# Patient Record
Sex: MALE | Race: WHITE | NOT HISPANIC OR LATINO | Employment: FULL TIME | URBAN - METROPOLITAN AREA
[De-identification: names, ages, dates, MRNs, and addresses within clinical notes are randomized per-mention and may not be internally consistent; named-entity substitution may affect disease eponyms.]

---

## 2018-11-13 ENCOUNTER — OFFICE VISIT (OUTPATIENT)
Dept: URGENT CARE | Facility: CLINIC | Age: 53
End: 2018-11-13
Payer: COMMERCIAL

## 2018-11-13 VITALS
WEIGHT: 270 LBS | TEMPERATURE: 98 F | DIASTOLIC BLOOD PRESSURE: 78 MMHG | RESPIRATION RATE: 18 BRPM | SYSTOLIC BLOOD PRESSURE: 156 MMHG | HEIGHT: 70 IN | OXYGEN SATURATION: 100 % | BODY MASS INDEX: 38.65 KG/M2 | HEART RATE: 80 BPM

## 2018-11-13 DIAGNOSIS — S61.219A LACERATION WITHOUT FOREIGN BODY OF UNSPECIFIED FINGER WITHOUT DAMAGE TO NAIL, INITIAL ENCOUNTER: Primary | ICD-10-CM

## 2018-11-13 PROCEDURE — S9088 SERVICES PROVIDED IN URGENT: HCPCS | Performed by: PHYSICIAN ASSISTANT

## 2018-11-13 PROCEDURE — 99203 OFFICE O/P NEW LOW 30 MIN: CPT | Performed by: PHYSICIAN ASSISTANT

## 2018-11-13 PROCEDURE — 12002 RPR S/N/AX/GEN/TRNK2.6-7.5CM: CPT | Performed by: PHYSICIAN ASSISTANT

## 2018-11-13 RX ORDER — AMLODIPINE BESYLATE 10 MG/1
TABLET ORAL
COMMUNITY
Start: 2018-11-02

## 2018-11-13 NOTE — PROGRESS NOTES
[facility]        NAME: Guillermina Jarrett is a 47 y o  male  : 1965    MRN: [id number]  DATE: 2018  TIME: 1:01 PM    Assessment and Plan   Laceration without foreign body of unspecified finger without damage to nail, initial encounter [[id number] [id number]]  1  Laceration without foreign body of unspecified finger without damage to nail, initial encounter       Patient Instructions     Keep wound clean and dry  Return in 10 days to have sutures removed  Follow up with PCP in 3-5 days  Proceed to  ER if symptoms worsen  Chief Complaint     Chief Complaint   Patient presents with    finger laceration     lacerated left 3rd finger on left 3rd finger     History of Present Illness     60-year-old male presents with laceration to distal aspect of left middle finger  Patient was attempting to use a new Omate  when he cut finger  Patient received tetanus shot 7 years ago  Patient denies numbness, weakness, tingling  Review of Systems   Review of Systems   Constitutional: Negative for activity change, appetite change, chills, diaphoresis, fatigue, fever and unexpected weight change  Cardiovascular: Negative for chest pain, palpitations and leg swelling  Skin: Positive for wound  Negative for color change, pallor and rash           Current Medications       Current Outpatient Prescriptions:     amLODIPine (NORVASC) 10 mg tablet, , Disp: , Rfl:     Current Allergies     Allergies as of 2018    (No Known Allergies)            The following portions of the patient's history were reviewed and updated as appropriate: allergies, current medications, past family history, past medical history, past social history, past surgical history and problem list      Past Medical History:   Diagnosis Date    Hypertension        Past Surgical History:   Procedure Laterality Date    NO PAST SURGERIES         Family History   Problem Relation Age of Onset    No Known Problems Mother    Anjelica Zepeda Cancer Father          Medications have been verified  Objective   /78   Pulse 80   Temp 98 °F (36 7 °C) (Tympanic)   Resp 18   Ht 5' 10" (1 778 m)   Wt 122 kg (270 lb)   SpO2 100%   BMI 38 74 kg/m²        Physical Exam     Physical Exam   Constitutional: He appears well-developed and well-nourished  Cardiovascular: Normal rate, regular rhythm, normal heart sounds and intact distal pulses  Exam reveals no gallop and no friction rub  No murmur heard  Pulmonary/Chest: Effort normal and breath sounds normal  No respiratory distress  He has no wheezes  He has no rales  Abdominal: Soft  Bowel sounds are normal  He exhibits no distension  There is no tenderness  There is no rebound and no guarding  Skin:   4 cm flap laceration to the distal aspect of left 3rd digit; wound is clean with no foreign bodies       Laceration repair  Date/Time: 11/13/2018 1:06 PM  Performed by: Terrance Suresh  Authorized by: Terrance Suresh   Consent: Verbal consent obtained    Patient identity confirmed: verbally with patient  Body area: upper extremity  Location details: left long finger  Laceration length: 4 cm  Foreign bodies: no foreign bodies  Tendon involvement: none  Nerve involvement: none  Vascular damage: no  Anesthesia: local infiltration and digital block    Anesthesia:  Local Anesthetic: lidocaine 2% without epinephrine  Anesthetic total: 2 mL    Sedation:  Patient sedated: no    Wound Dehiscence:  Superficial Wound Dehiscence: simple closure      Procedure Details:  Irrigation solution: saline  Skin closure: 5-0 nylon  Number of sutures: 8  Technique: simple  Approximation: close  Approximation difficulty: simple  Dressing: 4x4 sterile gauze and pressure dressing

## 2018-11-24 ENCOUNTER — OFFICE VISIT (OUTPATIENT)
Dept: URGENT CARE | Facility: CLINIC | Age: 53
End: 2018-11-24
Payer: COMMERCIAL

## 2018-11-24 VITALS
DIASTOLIC BLOOD PRESSURE: 83 MMHG | RESPIRATION RATE: 20 BRPM | HEART RATE: 95 BPM | HEIGHT: 70 IN | WEIGHT: 270 LBS | SYSTOLIC BLOOD PRESSURE: 143 MMHG | OXYGEN SATURATION: 96 % | TEMPERATURE: 98.6 F | BODY MASS INDEX: 38.65 KG/M2

## 2018-11-24 DIAGNOSIS — Z48.02 ENCOUNTER FOR REMOVAL OF SUTURES: Primary | ICD-10-CM

## 2018-11-24 PROCEDURE — 99211 OFF/OP EST MAY X REQ PHY/QHP: CPT | Performed by: EMERGENCY MEDICINE

## 2018-11-24 NOTE — PROGRESS NOTES
[facility]        NAME: Rupa Yadav is a 47 y o  male  : 1965    MRN: [id number]  DATE: 2018  TIME: 1:07 PM    Assessment and Plan   No primary diagnosis found  No diagnosis found  Patient Instructions     There are no Patient Instructions on file for this visit  Follow up with PCP in 3-5 days  Proceed to  ER if symptoms worsen  Chief Complaint     Chief Complaint   Patient presents with    Suture / Staple Removal     left 3rd finger     Suture removal  Date/Time: 2018 1:06 PM  Performed by: Kathi Gabriel by: Ousmane Alvarez     Patient location:  Bedside  Consent:     Consent obtained:  Verbal    Consent given by:  Patient    Risks discussed:  Pain and wound separation    Alternatives discussed:  Delayed treatment  Universal protocol:     Procedure explained and questions answered to patient or proxy's satisfaction: yes      Patient identity confirmed:  Verbally with patient  Location:     Laterality:  Left    Location:  Upper extremity    Upper extremity location:  Hand    Hand location:  L long finger  Procedure details: Tools used:  Suture removal kit and scalpel    Wound appearance:  No sign(s) of infection and clean    Number of sutures removed:  6  Post-procedure details:     Patient tolerance of procedure: Tolerated well, no immediate complications          History of Present Illness       Here for suture removal days after repair, no complaints  Review of Systems   Review of Systems   Constitutional: Negative for fever  Skin: Negative for color change           Current Medications       Current Outpatient Prescriptions:     amLODIPine (NORVASC) 10 mg tablet, , Disp: , Rfl:     Current Allergies     Allergies as of 2018    (No Known Allergies)            The following portions of the patient's history were reviewed and updated as appropriate: allergies, current medications, past family history, past medical history, past social history, past surgical history and problem list      Past Medical History:   Diagnosis Date    Hypertension        Past Surgical History:   Procedure Laterality Date    NO PAST SURGERIES         Family History   Problem Relation Age of Onset    No Known Problems Mother     Cancer Father          Medications have been verified  Objective   /83 (BP Location: Right arm, Patient Position: Sitting, Cuff Size: Adult)   Pulse 95   Temp 98 6 °F (37 °C) (Tympanic)   Resp 20   Ht 5' 10" (1 778 m)   Wt 122 kg (270 lb)   SpO2 96%   BMI 38 74 kg/m²        Physical Exam     Physical Exam   Skin: Skin is warm and dry  No erythema  Nursing note and vitals reviewed